# Patient Record
Sex: FEMALE | Race: WHITE
[De-identification: names, ages, dates, MRNs, and addresses within clinical notes are randomized per-mention and may not be internally consistent; named-entity substitution may affect disease eponyms.]

---

## 2017-01-03 ENCOUNTER — HOSPITAL ENCOUNTER (OUTPATIENT)
Dept: HOSPITAL 45 - C.RADBC | Age: 72
Discharge: HOME | End: 2017-01-03
Attending: FAMILY MEDICINE
Payer: COMMERCIAL

## 2017-01-03 DIAGNOSIS — J40: ICD-10-CM

## 2017-01-03 DIAGNOSIS — J06.9: Primary | ICD-10-CM

## 2017-01-03 NOTE — DIAGNOSTIC IMAGING REPORT
CHEST 2 VIEWS ROUTINE



CLINICAL HISTORY: BRONCHITIS dyspnea



COMPARISON STUDY:  6/6/2016



FINDINGS: Lungs are clear. Diaphragms smooth. No evidence for cardiac

enlargement. Mild emphysematous change. 



IMPRESSION:  Chronic change. No acute process. Mild emphysematous change. 









Electronically signed by:  Obinna Sanford M.D.

1/3/2017 4:32 PM

## 2017-03-23 ENCOUNTER — HOSPITAL ENCOUNTER (OUTPATIENT)
Dept: HOSPITAL 45 - C.CTS | Age: 72
Discharge: HOME | End: 2017-03-23
Attending: OTOLARYNGOLOGY
Payer: COMMERCIAL

## 2017-03-23 DIAGNOSIS — J32.9: Primary | ICD-10-CM

## 2017-03-23 NOTE — DIAGNOSTIC IMAGING REPORT
Study: Fusion CT of the sinuses



HISTORY: Chronic sinusitis.



FINDINGS: All major sinuses are clear. Ostiomeatal units are patent bilaterally.

There is mild hyperplastic changes the nasal turbinates.



All major osseous structures are intact. There is no evidence for bony or

destructive process.



IMPRESSION:

1. Mild hyperplastic changes of the nasal turbinates.





2. All major sinuses are clear







Electronically signed by:  Obinna Sanford M.D.

3/23/2017 1:56 PM



Dictated Date/Time:  3/23/2017 1:51 PM

## 2017-06-21 ENCOUNTER — HOSPITAL ENCOUNTER (OUTPATIENT)
Dept: HOSPITAL 45 - C.MAMM | Age: 72
Discharge: HOME | End: 2017-06-21
Attending: FAMILY MEDICINE
Payer: COMMERCIAL

## 2017-06-21 DIAGNOSIS — Z12.31: Primary | ICD-10-CM

## 2017-06-21 NOTE — MAMMOGRAPHY REPORT
BILATERAL DIGITAL SCREENING MAMMOGRAM WITH CAD: 6/21/2017

CLINICAL HISTORY: Routine screening.  Patient has no complaints.  





TECHNIQUE: Bilateral CC, MLO and right XCCL views were obtained.  Current study was also evaluated wi
th a Computer Aided Detection (CAD) system.  



COMPARISON: Comparison is made to exams dated:  6/20/2016 mammogram, 6/18/2015 mammogram, 5/30/2014 m
ammogram, 5/7/2013 mammogram, 11/3/2011 mammogram - [facility], and 6/9/2009 mammog
[place].   



BREAST COMPOSITION:  There are scattered areas of fibroglandular density in both breasts.  



FINDINGS:  The parenchymal pattern is unchanged. No developing mass, architectural distortion or clus
ter of suspicious microcalcifications is seen in either breast.  





IMPRESSION:  ACR BI-RADS CATEGORY 2: BENIGN

There is no mammographic evidence of malignancy. A 1 year screening mammogram is recommended.  The pa
tient will receive written notification of the results.  





Approximately 10% of breast cancers are not detected with mammography. A negative mammographic report
 should not delay biopsy if a clinically suggestive mass is present.



Skylar Miranda M.D.          

ay/:6/21/2017 12:54:27  



Imaging Technologist: Carmen BRUMFIELD([place])(VENUS)(BD), [facility]

letter sent: Normal 1/2  

BI-RADS Code: ACR BI-RADS Category 2: Benign

## 2017-11-17 ENCOUNTER — HOSPITAL ENCOUNTER (OUTPATIENT)
Dept: HOSPITAL 45 - C.RAD1850 | Age: 72
Discharge: HOME | End: 2017-11-17
Attending: FAMILY MEDICINE
Payer: COMMERCIAL

## 2017-11-17 DIAGNOSIS — M21.611: ICD-10-CM

## 2017-11-17 DIAGNOSIS — I73.9: ICD-10-CM

## 2017-11-17 DIAGNOSIS — M89.8X7: ICD-10-CM

## 2017-11-17 DIAGNOSIS — M20.11: Primary | ICD-10-CM

## 2017-11-17 NOTE — DIAGNOSTIC IMAGING REPORT
R FOOT MIN 3 VIEWS ROUTINE



HISTORY:  72 years-old Female R FOOT PAIN,MID FOOT acute right foot pain with

history of remote injury



COMPARISON: None available



TECHNIQUE: 3 views of the right foot



FINDINGS: 

The bones are mildly demineralized. Hallux valgus deformity is noted with mild

to moderate first MTP joint degenerative changes with bunion formation. Mostly

mild degenerative changes are seen throughout the interphalangeal joints. No

acute fracture, dislocation or stress fracture notified. Spurring about the

calcaneus is noted at the Achilles and plantar insertion site. No opaque foreign

body. Vascular calcifications are noted.



IMPRESSION: 

1. Mild demineralized appearance of the bones without acute fracture or

dislocation.

2. Hallux valgus deformity with mild to moderate first MTP joint degenerative

changes and bunion formation.

3. Peripheral vascular disease.







The above report was generated using voice recognition software. It may contain

grammatical, syntax or spelling errors.







Electronically signed by:  Markie Enriquez M.D.

11/17/2017 12:34 PM



Dictated Date/Time:  11/17/2017 12:31 PM

## 2017-12-01 ENCOUNTER — HOSPITAL ENCOUNTER (OUTPATIENT)
Dept: HOSPITAL 45 - C.RAD1850 | Age: 72
Discharge: HOME | End: 2017-12-01
Attending: FAMILY MEDICINE
Payer: COMMERCIAL

## 2017-12-01 DIAGNOSIS — M25.561: Primary | ICD-10-CM

## 2017-12-01 NOTE — DIAGNOSTIC IMAGING REPORT
RIGHT KNEE 2 VIEWS



HISTORY:      Right knee pain.



COMPARISON: None.



FINDINGS: There is no fracture or dislocation. Soft tissues are unremarkable. No

radiopaque foreign bodies. Mild cartilage space narrowing within the medial

compartment consistent with osteoarthritis. No knee effusion.



IMPRESSION:  

No fractures.







Electronically signed by:  Jean Sherman M.D.

12/1/2017 4:31 PM



Dictated Date/Time:  12/1/2017 4:30 PM

## 2018-01-22 ENCOUNTER — HOSPITAL ENCOUNTER (OUTPATIENT)
Dept: HOSPITAL 45 - C.RADBC | Age: 73
Discharge: HOME | End: 2018-01-22
Attending: FAMILY MEDICINE
Payer: COMMERCIAL

## 2018-01-22 DIAGNOSIS — Z87.01: ICD-10-CM

## 2018-01-22 DIAGNOSIS — R05: Primary | ICD-10-CM

## 2023-07-14 NOTE — DIAGNOSTIC IMAGING REPORT
CHEST 2 VIEWS ROUTINE



HISTORY:  72 years-old Female COUGH acute cough



COMPARISON: Chest radiographs 1/3/2017



TECHNIQUE: PA and lateral views of the chest



FINDINGS: 

Cardiomediastinal and hilar silhouettes are within normal limits.

Atherosclerosis of the aorta. Emphysematous changes with mild hyperinflation.

There is no pneumothorax, pleural effusion, focal airspace consolidation or

overt pulmonary edema. Multilevel endplate spurring of the spine. Mild

dextroscoliosis of the mid thoracic spine.



IMPRESSION: No acute process. 







The above report was generated using voice recognition software. It may contain

grammatical, syntax or spelling errors.







Electronically signed by:  Markie Enriquez M.D.

1/22/2018 4:44 PM



Dictated Date/Time:  1/22/2018 4:43 PM
No